# Patient Record
Sex: FEMALE | Race: OTHER | NOT HISPANIC OR LATINO | URBAN - METROPOLITAN AREA
[De-identification: names, ages, dates, MRNs, and addresses within clinical notes are randomized per-mention and may not be internally consistent; named-entity substitution may affect disease eponyms.]

---

## 2021-01-09 ENCOUNTER — EMERGENCY (EMERGENCY)
Facility: HOSPITAL | Age: 46
LOS: 1 days | Discharge: HOME | End: 2021-01-09
Attending: EMERGENCY MEDICINE | Admitting: EMERGENCY MEDICINE
Payer: MEDICAID

## 2021-01-09 VITALS
TEMPERATURE: 99 F | OXYGEN SATURATION: 100 % | SYSTOLIC BLOOD PRESSURE: 113 MMHG | RESPIRATION RATE: 22 BRPM | HEART RATE: 100 BPM | DIASTOLIC BLOOD PRESSURE: 67 MMHG

## 2021-01-09 LAB — SARS-COV-2 RNA SPEC QL NAA+PROBE: DETECTED

## 2021-01-09 PROCEDURE — 71045 X-RAY EXAM CHEST 1 VIEW: CPT | Mod: 26

## 2021-01-09 PROCEDURE — 99283 EMERGENCY DEPT VISIT LOW MDM: CPT

## 2021-01-09 PROCEDURE — 93010 ELECTROCARDIOGRAM REPORT: CPT

## 2021-01-09 RX ORDER — IBUPROFEN 200 MG
400 TABLET ORAL ONCE
Refills: 0 | Status: COMPLETED | OUTPATIENT
Start: 2021-01-09 | End: 2021-01-09

## 2021-01-09 RX ORDER — ONDANSETRON 8 MG/1
1 TABLET, FILM COATED ORAL
Qty: 21 | Refills: 0
Start: 2021-01-09 | End: 2021-01-15

## 2021-01-09 RX ADMIN — Medication 400 MILLIGRAM(S): at 12:33

## 2021-01-09 NOTE — ED PROVIDER NOTE - CLINICAL SUMMARY MEDICAL DECISION MAKING FREE TEXT BOX
45F here with uri-type sxs. Mildy tachycardic, tachypneic when coughing, sat 100% on RA. Exam benign. Likely viral syndrome. Eval for occult PNA. Check CXr, JEFF hardin.

## 2021-01-09 NOTE — ED PROVIDER NOTE - WR INTERPRETATION 1
CXR negative - No infiltrates, No consolidation, No atelectasis seen  Narciso Gibbs D.O. ([facility]; resident)

## 2021-01-09 NOTE — ED PROVIDER NOTE - OBJECTIVE STATEMENT
45F hx of asthma presents to the ED for subjective fevers, chills, dry cough, intermittent shortness of breath after coughing w/o chest pain, nausea, vomiting, dysuria, diarrhea, abdominal pain. Mom with similar sxs at home. Tried azithromycin, started 2 days, 3 days left. Took albuterol BID with some relief. Has another inhaler at home (doesn't remember name) that she has tried with some relief. Decreased PO. Took tylenol 500mg at 08:00. 45F hx of asthma presents to the ED for subjective fevers, chills, dry cough, intermittent shortness of breath after coughing w/o chest pain, nausea, vomiting, dysuria, diarrhea, abdominal pain. Mom with similar sxs at home. Tried azithromycin, started 2 days, 3 days left. Took albuterol BID with some relief. Has another inhaler at home (doesn't remember name) that she has tried with some relief. Decreased PO. Took tylenol 500mg at 08:00. Got flu shot this year.

## 2021-01-09 NOTE — ED PROVIDER NOTE - NS ED ROS FT
CONSTITUTIONAL: SUBJECTIVE FEVERS, CHILLS. No fatigue, lightheadedness, weakness, unexpected weight change  HEENT: No vision changes, discharge from eyes, nasal congestion, runny nose, sore throat, ear pain or discharge  CV: No chest pain  PULM: No cough, shortness of breath  GI: No abdominal pain, nausea, vomiting, diarrhea, constipation  : No dysuria, polyuria, hematuria  SKIN: No rashes, swelling, open wounds or ulcers  MSK: MYALGIAS  NEURO: No headache

## 2021-01-09 NOTE — ED PROVIDER NOTE - ATTENDING CONTRIBUTION TO CARE
Dr. Garcia:  I have personally performed a face to face bedside history and physical examination of this patient. I have discussed the history, examination, review of systems, assessment and plan of management with the resident. I have reviewed the electronic medical record and amended it to reflect my history, review of systems, physical exam, assessment and plan.    45F h/o asthma presents for several days of myalgias, cough, intermittent SOB during coughing episodes.  Denies fever/chills, cp, n/v/d.    Exam:  - nad  - rrr  - ctab  - abd soft ntnd    A/P  - likely viral, r/o PNA  - CXR, covid swab

## 2021-01-09 NOTE — ED PROVIDER NOTE - NSFOLLOWUPINSTRUCTIONS_ED_ALL_ED_FT
YOU WERE SEEN IN THE ED FOR: fever, chills, cough    YOU WERE PRESCRIBED: yahir perez  FOLLOW THE INSTRUCTIONS ON THE LABEL/CONTAINER    FOR PAIN/FEVER, YOU MAY TAKE TYLENOL (acetaminophen) AND/OR IBUPROFEN (advil or motrin). FOLLOW THE INSTRUCTIONS ON THE LABEL/CONTAINER.    PLEASE FOLLOW UP WITH YOUR PRIVATE PHYSICIAN WITHIN THE NEXT 72 HOURS. BRING COPIES OF YOUR RESULTS.    RETURN TO THE EMERGENCY DEPARTMENT IF YOU EXPERIENCE ANY NEW/CONCERNING/WORSENING SYMPTOMS.

## 2021-01-09 NOTE — ED PROVIDER NOTE - PATIENT PORTAL LINK FT
You can access the FollowMyHealth Patient Portal offered by [facility] by registering at the following website: [email or web address]. By joining [employer]’s FollowMyHealth portal, you will also be able to view your health information using other applications (apps) compatible with our system.

## 2021-01-10 ENCOUNTER — EMERGENCY (EMERGENCY)
Facility: HOSPITAL | Age: 46
LOS: 1 days | Discharge: HOME | End: 2021-01-10
Attending: EMERGENCY MEDICINE | Admitting: EMERGENCY MEDICINE
Payer: MEDICAID

## 2021-01-10 VITALS
TEMPERATURE: 98 F | DIASTOLIC BLOOD PRESSURE: 78 MMHG | RESPIRATION RATE: 18 BRPM | HEART RATE: 96 BPM | SYSTOLIC BLOOD PRESSURE: 142 MMHG | OXYGEN SATURATION: 100 %

## 2021-01-10 PROBLEM — J45.909 UNSPECIFIED ASTHMA, UNCOMPLICATED: Chronic | Status: ACTIVE | Noted: 2021-01-09

## 2021-01-10 PROCEDURE — 99283 EMERGENCY DEPT VISIT LOW MDM: CPT

## 2021-01-10 RX ORDER — ALBUTEROL 90 UG/1
2 AEROSOL, METERED ORAL ONCE
Refills: 0 | Status: COMPLETED | OUTPATIENT
Start: 2021-01-10 | End: 2021-01-10

## 2021-01-10 RX ORDER — ALBUTEROL 90 UG/1
3 AEROSOL, METERED ORAL
Qty: 1 | Refills: 0
Start: 2021-01-10

## 2021-01-10 RX ADMIN — ALBUTEROL 2 PUFF(S): 90 AEROSOL, METERED ORAL at 11:06

## 2021-01-10 NOTE — ED PROVIDER NOTE - OBJECTIVE STATEMENT
44 y/o female with PMHx of Asthma who presented to the ED for cough X 5 days. Pt states over the past 5 days having a non-productive cough. Pt states she was found to be COVID + and was seen here yesterday for similar cough. Pt denies any fever, chills, SOB, chest pain, or abd pain. Denies any LE swelling or calf tenderness. Pt was prescribed Tessalon Pearls with minimal relief.

## 2021-01-10 NOTE — ED PROVIDER NOTE - PATIENT PORTAL LINK FT
You can access the FollowMyHealth Patient Portal offered by [facility] by registering at the following website: [email or web address]. By joining [employer]’s FollowMyHealth portal, you will also be able to view your health information using other applications (apps) compatible with our system.

## 2021-01-10 NOTE — ED PROVIDER NOTE - CLINICAL SUMMARY MEDICAL DECISION MAKING FREE TEXT BOX
46 y/o female with PMHx of Asthma who presented to the ED for cough X 5 days.   Concern for COVID/Cough  Albuterol MDI, Cough Syrup

## 2021-01-10 NOTE — ED PROVIDER NOTE - NSFOLLOWUPINSTRUCTIONS_ED_ALL_ED_FT
Rest and plenty of fluids.    May use Albuterol inhaler with spacer every 6 hours as needed for cough.  May take Hycodan cough syrup 3 times per day.     Return for any worsening symptoms.

## 2021-01-10 NOTE — ED ADULT TRIAGE NOTE - CHIEF COMPLAINT QUOTE
Pt states she was seen here yesterday for cough and SOB, diagnosed with COVID and discharged. Pt returns today stating she is coughing too much. Denies any new symptoms.

## 2021-07-25 NOTE — ED PROVIDER NOTE - CROS ED SKIN ALL NEG
Problem: At Risk for Falls  Goal: # Patient does not fall  Outcome: Outcome Met, Continue evaluating goal progress toward completion  Note: Call light and belongings within reach. Bed alarm activated, Red nonslip footies on. Hourly rounding.      negative...